# Patient Record
Sex: MALE | Race: WHITE | Employment: FULL TIME | URBAN - METROPOLITAN AREA
[De-identification: names, ages, dates, MRNs, and addresses within clinical notes are randomized per-mention and may not be internally consistent; named-entity substitution may affect disease eponyms.]

---

## 2018-10-10 ENCOUNTER — HOSPITAL ENCOUNTER (EMERGENCY)
Facility: CLINIC | Age: 23
Discharge: HOME | End: 2018-10-11
Attending: EMERGENCY MEDICINE | Admitting: EMERGENCY MEDICINE
Payer: OTHER MISCELLANEOUS

## 2018-10-10 DIAGNOSIS — Z77.29 CARBON MONOXIDE EXPOSURE: ICD-10-CM

## 2018-10-10 DIAGNOSIS — R51.9 NONINTRACTABLE HEADACHE, UNSPECIFIED CHRONICITY PATTERN, UNSPECIFIED HEADACHE TYPE: ICD-10-CM

## 2018-10-10 LAB
BASOPHILS # BLD AUTO: 0 10E9/L (ref 0–0.2)
BASOPHILS NFR BLD AUTO: 0.6 %
DIFFERENTIAL METHOD BLD: NORMAL
EOSINOPHIL # BLD AUTO: 0.1 10E9/L (ref 0–0.7)
EOSINOPHIL NFR BLD AUTO: 2 %
ERYTHROCYTE [DISTWIDTH] IN BLOOD BY AUTOMATED COUNT: 12.4 % (ref 10–15)
HCT VFR BLD AUTO: 40 % (ref 40–53)
HGB BLD-MCNC: 13.4 G/DL (ref 13.3–17.7)
IMM GRANULOCYTES # BLD: 0 10E9/L (ref 0–0.4)
IMM GRANULOCYTES NFR BLD: 0.2 %
LYMPHOCYTES # BLD AUTO: 3 10E9/L (ref 0.8–5.3)
LYMPHOCYTES NFR BLD AUTO: 46.8 %
MCH RBC QN AUTO: 27.6 PG (ref 26.5–33)
MCHC RBC AUTO-ENTMCNC: 33.5 G/DL (ref 31.5–36.5)
MCV RBC AUTO: 82 FL (ref 78–100)
MONOCYTES # BLD AUTO: 0.6 10E9/L (ref 0–1.3)
MONOCYTES NFR BLD AUTO: 9.1 %
NEUTROPHILS # BLD AUTO: 2.6 10E9/L (ref 1.6–8.3)
NEUTROPHILS NFR BLD AUTO: 41.3 %
NRBC # BLD AUTO: 0 10*3/UL
NRBC BLD AUTO-RTO: 0 /100
PLATELET # BLD AUTO: 204 10E9/L (ref 150–450)
RBC # BLD AUTO: 4.86 10E12/L (ref 4.4–5.9)
WBC # BLD AUTO: 6.4 10E9/L (ref 4–11)

## 2018-10-10 PROCEDURE — 85025 COMPLETE CBC W/AUTO DIFF WBC: CPT | Performed by: PHYSICIAN ASSISTANT

## 2018-10-10 PROCEDURE — 99283 EMERGENCY DEPT VISIT LOW MDM: CPT

## 2018-10-10 PROCEDURE — 84484 ASSAY OF TROPONIN QUANT: CPT | Performed by: PHYSICIAN ASSISTANT

## 2018-10-10 PROCEDURE — 80048 BASIC METABOLIC PNL TOTAL CA: CPT | Performed by: PHYSICIAN ASSISTANT

## 2018-10-10 ASSESSMENT — ENCOUNTER SYMPTOMS
WEAKNESS: 0
DIZZINESS: 0
HEADACHES: 1
VOMITING: 0
PALPITATIONS: 1
NAUSEA: 0
SHORTNESS OF BREATH: 0
CHILLS: 0
FEVER: 0
FATIGUE: 1

## 2018-10-10 NOTE — ED AVS SNAPSHOT
Emergency Department    [address] [place] [address]    Phone:  [phone]    Fax:  [phone]                                       Anand Rangel   MRN: [id number]    Department:   Emergency Department   Date of Visit:  10/10/2018           After Visit Summary Signature Page     I have received my discharge instructions, and my questions have been answered. I have discussed any challenges I see with this plan with the nurse or doctor.    ..........................................................................................................................................  Patient/Patient Representative Signature      ..........................................................................................................................................  Patient Representative Print Name and Relationship to Patient    ..................................................               ................................................  Date                                   Time    ..........................................................................................................................................  Reviewed by Signature/Title    ...................................................              ..............................................  Date                                               Time          22EPIC Rev 08/18

## 2018-10-10 NOTE — ED AVS SNAPSHOT
Emergency Department    [address] [facility] [address]    Phone:  [phone]    Fax:  [phone]                                       Anand Rangel   MRN: [id number]    Department:   Emergency Department   Date of Visit:  10/10/2018           Patient Information     Date Of Birth          1995        Your diagnoses for this visit were:     Carbon monoxide exposure     Nonintractable headache, unspecified chronicity pattern, unspecified headache type        You were seen by Dain Angel MD.      Follow-up Information     Go to  Emergency Department.    Specialty:  EMERGENCY MEDICINE    Why:  changing or worsening headache, increased lethargy, nausea or vomiting, new concerns.    Contact information:    [address] [facility] [address] [phone]        Follow up with Teodoro Jason MD. Call in 2 days.    Specialty:  Internal Medicine    Why:  Call if you do not have a primary care provider and continue to have residual symptoms.    Contact information:    [address] [id number] [phone]          Discharge Instructions       Take Tylenol or ibuprofen as needed at home if you have residual headache.  If you do continue to have minor symptoms, follow-up with your primary care provider within the next few days.  Return to the ED for any changing or worsening headache, increased lethargy, nausea or vomiting, new concerns.    Discharge References/Attachments     CARBON MONOXIDE (BLOOD) (ENGLISH)    POISONING, CARBON MONOXIDE (ENGLISH)      24 Hour Appointment Hotline       To make an appointment at any [facility], call [phone] ([phone]). If you don't have a family doctor or clinic, we will help you find one. Center clinics are conveniently located to serve the needs of you and your family.             Review of your medicines      Notice     You have not been prescribed any medications.             Procedures and tests performed during your visit     Basic metabolic panel    Blood gas venous    CBC with platelets differential    Carbon monoxide    EKG 12-lead, tracing only    Peripheral IV catheter    Troponin I      Orders Needing Specimen Collection     None      Pending Results     No orders found for last 3 day(s).            Pending Culture Results     No orders found for last 3 day(s).            Pending Results Instructions     If you had any lab results that were not finalized at the time of your Discharge, you can call the ED Lab Result RN at [phone]. You will be contacted by this team for any positive Lab results or changes in treatment. The nurses are available 7 days a week from 10A to 6:30P.  You can leave a message 24 hours per day and they will return your call.        Test Results From Your Hospital Stay        10/11/2018 12:14 AM      Component Results     Component Value Ref Range & Units Status    Troponin I ES <0.015 0.000 - 0.045 ug/L Final    The 99th percentile for upper reference range is 0.045 ug/L.  Troponin values   in the range of 0.045 - 0.120 ug/L may be associated with risks of adverse   clinical events.           10/11/2018 12:47 AM      Component Results     Component Value Ref Range & Units Status    Ph Venous 7.43 7.32 - 7.43 pH Final    PCO2 Venous 38 (L) 40 - 50 mm Hg Final    PO2 Venous 52 (H) 25 - 47 mm Hg Final    Bicarbonate Venous 25 21 - 28 mmol/L Final    Base Excess Venous 0.8 mmol/L Final    Reference range:  -7.7 to 1.9         10/10/2018 11:54 PM      Component Results     Component Value Ref Range & Units Status    WBC 6.4 4.0 - 11.0 10e9/L Final    RBC Count 4.86 4.4 - 5.9 10e12/L Final    Hemoglobin 13.4 13.3 - 17.7 g/dL Final    Hematocrit 40.0 40.0 - 53.0 % Final    MCV 82 78 - 100 fl Final    MCH 27.6 26.5 - 33.0 pg Final    MCHC 33.5 31.5 - 36.5 g/dL Final    RDW 12.4 10.0 - 15.0 % Final    Platelet Count 204 150 - 450 10e9/L Final    Diff Method  Automated Method  Final    % Neutrophils 41.3 % Final    % Lymphocytes 46.8 % Final    % Monocytes 9.1 % Final    % Eosinophils 2.0 % Final    % Basophils 0.6 % Final    % Immature Granulocytes 0.2 % Final    Nucleated RBCs 0 0 /100 Final    Absolute Neutrophil 2.6 1.6 - 8.3 10e9/L Final    Absolute Lymphocytes 3.0 0.8 - 5.3 10e9/L Final    Absolute Monocytes 0.6 0.0 - 1.3 10e9/L Final    Absolute Eosinophils 0.1 0.0 - 0.7 10e9/L Final    Absolute Basophils 0.0 0.0 - 0.2 10e9/L Final    Abs Immature Granulocytes 0.0 0 - 0.4 10e9/L Final    Absolute Nucleated RBC 0.0  Final         10/11/2018 12:14 AM      Component Results     Component Value Ref Range & Units Status    Sodium 141 133 - 144 mmol/L Final    Potassium 3.6 3.4 - 5.3 mmol/L Final    Chloride 108 94 - 109 mmol/L Final    Carbon Dioxide 25 20 - 32 mmol/L Final    Anion Gap 8 3 - 14 mmol/L Final    Glucose 77 70 - 99 mg/dL Final    Urea Nitrogen 16 7 - 30 mg/dL Final    Creatinine 0.97 0.66 - 1.25 mg/dL Final    GFR Estimate >90 >60 mL/min/1.7m2 Final    Non  GFR Calc    GFR Estimate If Black >90 >60 mL/min/1.7m2 Final    African American GFR Calc    Calcium 9.1 8.5 - 10.1 mg/dL Final         10/11/2018 12:47 AM      Component Results     Component Value Ref Range & Units Status    Carbon Monoxide 1.4 0 - 2 % Final                Clinical Quality Measure: Blood Pressure Screening     Your blood pressure was checked while you were in the emergency department today. The last reading we obtained was  BP: 153/87 . Please read the guidelines below about what these numbers mean and what you should do about them.  If your systolic blood pressure (the top number) is less than 120 and your diastolic blood pressure (the bottom number) is less than 80, then your blood pressure is normal. There is nothing more that you need to do about it.  If your systolic blood pressure (the top number) is 120-139 or your diastolic blood pressure (the bottom number)  "is 80-89, your blood pressure may be higher than it should be. You should have your blood pressure rechecked within a year by a primary care provider.  If your systolic blood pressure (the top number) is 140 or greater or your diastolic blood pressure (the bottom number) is 90 or greater, you may have high blood pressure. High blood pressure is treatable, but if left untreated over time it can put you at risk for heart attack, stroke, or kidney failure. You should have your blood pressure rechecked by a primary care provider within the next 4 weeks.  If your provider in the emergency department today gave you specific instructions to follow-up with your doctor or provider even sooner than that, you should follow that instruction and not wait for up to 4 weeks for your follow-up visit.        Thank you for choosing [facility]       Thank you for choosing [facility] for your care. Our goal is always to provide you with excellent care. Hearing back from our patients is one way we can continue to improve our services. Please take a few minutes to complete the written survey that you may receive in the mail after you visit with us. Thank you!        [employer] Information     [employer] lets you send messages to your doctor, view your test results, renew your prescriptions, schedule appointments and more. To sign up, go to [email or web address] . Click on \"Log in\" on the left side of the screen, which will take you to the Welcome page. Then click on \"Sign up Now\" on the right side of the page.     You will be asked to enter the access code listed below, as well as some personal information. Please follow the directions to create your username and password.     Your access code is: [email or web address]  Expires: 2019  1:11 AM     Your access code will  in 90 days. If you need help or a new code, please call your [facility] clinic or [phone].        Care EveryWhere ID     This is your Care EveryWhere ID. This could be used by " other organizations to access your [place] [facility] medical records  [id number]        Equal Access to Services     RENEE BA : Pinky Dawson, yo gaspar, keanu medrano. So [facility] [phone].    ATENCIÓN: Si habla español, tiene a marrero disposición servicios gratuitos de asistencia lingüística. Llame al [phone].    We comply with applicable federal civil rights laws and [place] laws. We do not discriminate on the basis of race, color, national origin, age, disability, sex, sexual orientation, or gender identity.            After Visit Summary       This is your record. Keep this with you and show to your community pharmacist(s) and doctor(s) at your next visit.

## 2018-10-11 VITALS
HEIGHT: 73 IN | DIASTOLIC BLOOD PRESSURE: 77 MMHG | TEMPERATURE: 98.4 F | BODY MASS INDEX: 27.7 KG/M2 | OXYGEN SATURATION: 100 % | SYSTOLIC BLOOD PRESSURE: 134 MMHG | RESPIRATION RATE: 16 BRPM | WEIGHT: 209 LBS

## 2018-10-11 LAB
ANION GAP SERPL CALCULATED.3IONS-SCNC: 8 MMOL/L (ref 3–14)
BASE EXCESS BLDV CALC-SCNC: 0.8 MMOL/L
BUN SERPL-MCNC: 16 MG/DL (ref 7–30)
CALCIUM SERPL-MCNC: 9.1 MG/DL (ref 8.5–10.1)
CHLORIDE SERPL-SCNC: 108 MMOL/L (ref 94–109)
CO2 SERPL-SCNC: 25 MMOL/L (ref 20–32)
COHGB MFR BLD: 1.4 % (ref 0–2)
CREAT SERPL-MCNC: 0.97 MG/DL (ref 0.66–1.25)
GFR SERPL CREATININE-BSD FRML MDRD: >90 ML/MIN/1.7M2
GLUCOSE SERPL-MCNC: 77 MG/DL (ref 70–99)
HCO3 BLDV-SCNC: 25 MMOL/L (ref 21–28)
INTERPRETATION ECG - MUSE: NORMAL
PCO2 BLDV: 38 MM HG (ref 40–50)
PH BLDV: 7.43 PH (ref 7.32–7.43)
PO2 BLDV: 52 MM HG (ref 25–47)
POTASSIUM SERPL-SCNC: 3.6 MMOL/L (ref 3.4–5.3)
SODIUM SERPL-SCNC: 141 MMOL/L (ref 133–144)
TROPONIN I SERPL-MCNC: <0.015 UG/L (ref 0–0.04)

## 2018-10-11 PROCEDURE — 82375 ASSAY CARBOXYHB QUANT: CPT | Performed by: PHYSICIAN ASSISTANT

## 2018-10-11 PROCEDURE — 82803 BLOOD GASES ANY COMBINATION: CPT | Performed by: PHYSICIAN ASSISTANT

## 2018-10-11 NOTE — ED PROVIDER NOTES
"  History     Chief Complaint:  Headache    HPI   Anand Rangel is a 23 year old male who presents with headache after being exposed to carbon monoxide at work this week.  Patient states that on Monday, 2 days ago, he woke feeling well.  He then presented to work and developed a frontal headache.  He also noted increase in fatigue and that \"my vision appears more vibrant.\"  The patient works in a factory.  He then returned home and noted improvement in his symptoms.  On Tuesday when he presented to work again, he felt the same symptoms.  He also noted that some of his coworkers were also experiencing headache, fatigue, nausea and vomiting.  Today, he again developed a headache.  He was then informed that there have been gas leakages at his place of work.  Carbon monoxide detectors are brought in and noted to be positive.  He is unaware of what levels were detected.  Due to the these findings and his persistent headache, the patient resents to the ED for further evaluation.  He was placed on supplemental oxygen in triage.  He notes that his headache has improved to 3 out of 10 at this time.  He does note that he has felt intermittent symptoms of \"racing heartbeat.\"  He does not feel any of the symptoms right now.  Denies any chest pain, shortness of breath, nausea, vomiting.  States that he is otherwise healthy.  He is a non-smoker.    Allergies:  No known drug allergies    Medications:    The patient is currently on no regular medications.    Past Medical History:    The patient does not have any past pertinent medical history.    Past Surgical History:    Orthopedic surgery    Family History:    History reviewed. No pertinent family history.     Social History:  Marital Status:  Single [1]  Smoking status: Never Smoker  Alcohol use: Yes    Review of Systems   Constitutional: Positive for fatigue. Negative for chills and fever.   Respiratory: Negative for shortness of breath.    Cardiovascular: Positive for " "palpitations. Negative for chest pain.   Gastrointestinal: Negative for nausea and vomiting.   Neurological: Positive for headaches. Negative for dizziness and weakness.   All other systems reviewed and are negative.    Physical Exam     Patient Vitals for the past 24 hrs:   BP Temp Temp src Heart Rate Resp SpO2 Height Weight   10/11/18 0142 134/77 98.4  F (36.9  C) Oral 74 16 100 % - -   10/10/18 2224 153/87 98.9  F (37.2  C) Temporal 87 - 100 % 1.854 m (6' 1\") 94.8 kg (209 lb)       Physical Exam  General: Well appearing, well nourished. Normal mood and affect.  Skin: Good turgor, no rash, no unusual bruising or prominent lesions.  HEENT: Head: Normocephalic, atraumatic, no visible masses.   Eyes: Conjunctiva clear, sclera non-icteric, EOM intact, PERRL.   Throat/pharynx: Mucous membranes moist, no mucosal lesions. Mucosa non-inflamed, no tonsillar hypertrophy or exudate.   Cardiac: Normal rate and regular rhythm, no murmur or gallop.   Lungs: Clear to auscultation.  Abdomen: Bowel sounds normal, no tenderness, organomegaly, masses, or hernia. No guarding or rebound tenderness.   Musculoskeletal: Normal gait and station. No calf tenderness or swelling.   Neurologic: Oriented x 3.  Sensation intact throughout upper and lower extremities. GCS: 15.  Psychiatric: Intact recent and remote memory, judgment and insight, normal mood and affect.     Emergency Department Course   Imaging:  None    Laboratory:  Labs Ordered and Resulted from Time of ED Arrival Up to the Time of Departure from the ED   BLOOD GAS VENOUS - Abnormal; Notable for the following:        Result Value    PCO2 Venous 38 (*)     PO2 Venous 52 (*)     All other components within normal limits   TROPONIN I   CBC WITH PLATELETS DIFFERENTIAL   BASIC METABOLIC PANEL   CARBON MONOXIDE   PERIPHERAL IV CATHETER     Procedures:  None    Interventions:  None     Emergency Department Course:  Past medical records, nursing notes, and vitals reviewed.  I performed " an exam of the patient and obtained history, as documented above.    0105 I reevaluated the patient and updated him and his family as to the results of his workup thus far.  All questions were answered.  Treatment plan and discharge discussed.  Patient in agreement with this plan.  He also notes resolution of his headache.    Impression & Plan    Medical Decision Making:  Anand Rangel is a 23-year-old male who presented the ED today for concern of carbon monoxide exposure and headache.  Details of the patient's history can be noted in the HPI.  Upon my exam, the patient was well-appearing.  He had a normal neurological exam.  He had already been on supplemental oxygen started in triage.  High flow oxygen on a non-rebreather was continued after my exam.  Due to the history of carbon monoxide exposure, this level was checked in the ED.  This returned within normal limits.  Due to concern for cardiac pathology after clear, troponin and ECG were obtained.  These also returned without significant pathology.  CBC, BMP, VBG were also obtained, results within normal limits.  With continued oxygen, upon reevaluation the patient noted resolution of his head ache.  He remained vitally stable throughout his stay in the ED.  With his normal workup, I do feel comfortable with his discharge and outpatient management. His work environment is being corrected to prevent further exposure.  The patient will follow up with his primary care provider within the next few days if he has residual headache.  He will take Tylenol or ibuprofen as needed.  He will return to the ED for any changing or worsening symptoms, including worsening headache, nausea or vomiting, increased lethargy, new concerns.  All questions were answered prior the patient's discharge.  He was in agreement with the treatment plan as stated above.    Diagnosis:    ICD-10-CM    1. Carbon monoxide exposure Z77.29    2. Nonintractable headache, unspecified chronicity  pattern, unspecified headache type R51      Disposition:  discharged to home    Discharge Medications:  None    Sarah Bowman  10/10/2018    EMERGENCY DEPARTMENT    This was created at least in part with a voice recognition software. Mistakes/typos may be present.          Olena Myles, PA  10/11/18 0230

## 2018-10-11 NOTE — DISCHARGE INSTRUCTIONS
Take Tylenol or ibuprofen as needed at home if you have residual headache.  If you do continue to have minor symptoms, follow-up with your primary care provider within the next few days.  Return to the ED for any changing or worsening headache, increased lethargy, nausea or vomiting, new concerns.

## 2018-10-11 NOTE — ED PROVIDER NOTES
Emergency Department Attending Supervision Note  10/10/2018  11:48 PM      I evaluated this patient in conjunction with GINO Ireland      Briefly, the patient presented with a headache after being exposed to carbon monoxide at work this week. He states 2 days ago he started feeling ill with a frontal headache and fatigue. He notes his coworkers have also been experiencing similar symptoms like headache, fatigue, nausea and vomiting. Today he was informed of gas leakages at work, prompting his presentation to the ED for evaluation.  One of his coworkers is also apparently admitted to 1 of the other local hospitals for his symptoms.  He denies having any chest pain, shortness of breath, nausea or vomiting.      On my exam,  Nursing note and vitals reviewed.  Constitutional:  Oriented to person, place, and time. Cooperative.   HENT:   Nose:    Nose normal.   Mouth/Throat:   Mucous membranes are normal.   Eyes:    Conjunctivae normal and EOM are normal.      Pupils are equal, round, and reactive to light.   Neck:    Trachea normal.   Cardiovascular:  Normal rate, regular rhythm, normal heart sounds and normal pulses. No murmur heard.  Pulmonary/Chest:  Effort normal and breath sounds normal.   Abdominal:   Soft. Normal appearance and bowel sounds are normal.      There is no tenderness.      There is no rebound and no CVA tenderness.   Musculoskeletal:  Extremities atraumatic x 4.   Lymphadenopathy:  No cervical adenopathy.   Neurological:   Alert and oriented to person, place, and time. Normal strength.      No cranial nerve deficit or sensory deficit. GCS eye subscore is 4. GCS verbal subscore is 5. GCS motor subscore is 6.   Skin:    Skin is intact. No rash noted.   Psychiatric:   Normal mood and affect.      Results:  ECG (0:01:25):  Rate 67 bpm. DC interval 190. QRS duration 84. QT/QTc 370/390. P-R-T axes 62 88 56. Normal sinus rhythm. Anteroseptal infarct, age undetermined. Abnormal ECG. Interpreted at  0002 by Dain Angel MD.    Laboratory:  CBC: WNL (WBC 6.4, HGB 13.4, )  BMP: WNL (Creatinine 0.97)  Troponin: <0.015  Carbon Monoxide: 1.4  Blood Gas Venous: PCO2 38 (L), PO2 Venous 52 (H), o/w WNL    ED course:  Past medical records, nursing notes, and vitals reviewed.  0001: I performed an exam of the patient and obtained history, as documented above.   EKG was obtained, results above.  IV inserted and blood samples were collected and sent for laboratory testing, findings above.  Findings and plan explained to the patient. Patient discharged home with instructions regarding supportive care, medications, and reasons to return. The importance of close follow-up was reviewed.      My impression is this is a 23-year-old male who came in with headaches that have been mainly present at work.  Apparently his workplace was found to have elevated carbon monoxide levels, and multiple coworkers are also sick with similar symptoms.  We proceeded with the above workup here and he was provided high flow oxygen via facemask for an extended period of time.  His workup is unremarkable though, including his CO level.  I do not feel that he warrants admission to the hospital or being transferred to another hospital with a hyperbaric oxygen chamber.  He should return with any concerns or worsening symptoms.      Diagnosis   ICD-10-CM   Carbon monoxide exposure Z77.29   Nonintractable headache, unspecified chronicity pattern, unspecified headache type R51         Dain Angel MD Lashkowitz, Seth H, MD  10/11/18 0432       Dain Angel MD  10/11/18 0525